# Patient Record
Sex: MALE
[De-identification: names, ages, dates, MRNs, and addresses within clinical notes are randomized per-mention and may not be internally consistent; named-entity substitution may affect disease eponyms.]

---

## 2021-12-31 ENCOUNTER — NURSE TRIAGE (OUTPATIENT)
Dept: OTHER | Facility: CLINIC | Age: 36
End: 2021-12-31

## 2022-01-01 NOTE — TELEPHONE ENCOUNTER
Subjective: Caller states \"i'm having dizziness and can't stand up, light headness\"     Current Symptoms: dizziness, lightheadedness, can't stand without difficulty    Onset: 10 minutes ago; gradual    Associated Symptoms: NA    Pain Severity: 0/10; N/A; none    Temperature: n/a n/a    What has been tried: nothing    LMP: NA Pregnant: NA    Recommended disposition: see PCP within 24 hours, upgraded since holiday weekend recommended Patient be seen in nearest [facility] or ED, pt vebrlaized understanding. Care advice provided, patient verbalizes understanding; denies any other questions or concerns; instructed to call back for any new or worsening symptoms. Patient/caller proceeding to nearest [facility]     This triage is a result of a call to [address] [place] [address]. Please do not respond to the triage nurse through this encounter. Any subsequent communication should be directly with the patient.         Reason for Disposition   [1] MODERATE dizziness (e.g., interferes with normal activities) AND [2] has NOT been evaluated by physician for this  (Exception: dizziness caused by heat exposure, sudden standing, or poor fluid intake)    Protocols used: [facility]

## 2023-06-12 ENCOUNTER — OFFICE VISIT (OUTPATIENT)
Dept: PRIMARY CARE | Facility: CLINIC | Age: 38
End: 2023-06-12
Payer: COMMERCIAL

## 2023-06-12 VITALS
HEART RATE: 88 BPM | BODY MASS INDEX: 42.66 KG/M2 | HEIGHT: 72 IN | RESPIRATION RATE: 16 BRPM | WEIGHT: 315 LBS | SYSTOLIC BLOOD PRESSURE: 126 MMHG | OXYGEN SATURATION: 97 % | DIASTOLIC BLOOD PRESSURE: 79 MMHG

## 2023-06-12 DIAGNOSIS — J45.901 ASTHMA WITH ACUTE EXACERBATION, UNSPECIFIED ASTHMA SEVERITY, UNSPECIFIED WHETHER PERSISTENT (HHS-HCC): Primary | ICD-10-CM

## 2023-06-12 PROBLEM — R16.1 SPLENOMEGALY: Status: ACTIVE | Noted: 2023-06-12

## 2023-06-12 PROBLEM — K76.0 FATTY LIVER: Status: ACTIVE | Noted: 2023-06-12

## 2023-06-12 PROBLEM — R42 VERTIGO: Status: ACTIVE | Noted: 2023-06-12

## 2023-06-12 PROBLEM — E66.01 OBESITY, MORBID (MORE THAN 100 LBS OVER IDEAL WEIGHT OR BMI > 40) (MULTI): Status: ACTIVE | Noted: 2023-06-12

## 2023-06-12 PROBLEM — K57.90 DIVERTICULOSIS: Status: ACTIVE | Noted: 2023-06-12

## 2023-06-12 PROBLEM — J45.909 ASTHMA (HHS-HCC): Status: ACTIVE | Noted: 2023-06-12

## 2023-06-12 PROBLEM — R07.9 CHEST PAIN: Status: ACTIVE | Noted: 2023-06-12

## 2023-06-12 PROBLEM — D50.9 IDA (IRON DEFICIENCY ANEMIA): Status: ACTIVE | Noted: 2023-06-12

## 2023-06-12 PROBLEM — K80.20 CHOLELITHIASES: Status: ACTIVE | Noted: 2023-06-12

## 2023-06-12 PROBLEM — D58.2: Status: ACTIVE | Noted: 2023-06-12

## 2023-06-12 PROCEDURE — 1036F TOBACCO NON-USER: CPT | Performed by: FAMILY MEDICINE

## 2023-06-12 PROCEDURE — 99214 OFFICE O/P EST MOD 30 MIN: CPT | Performed by: FAMILY MEDICINE

## 2023-06-12 RX ORDER — ALBUTEROL SULFATE 90 UG/1
1 AEROSOL, METERED RESPIRATORY (INHALATION) EVERY 4 HOURS PRN
Qty: 8 G | Refills: 0 | Status: SHIPPED | OUTPATIENT
Start: 2023-06-12 | End: 2023-07-05 | Stop reason: SDUPTHER

## 2023-06-12 RX ORDER — BECLOMETHASONE DIPROPIONATE HFA 80 UG/1
1 AEROSOL, METERED RESPIRATORY (INHALATION) 2 TIMES DAILY
COMMUNITY
End: 2023-06-12 | Stop reason: SDUPTHER

## 2023-06-12 RX ORDER — AMOXICILLIN AND CLAVULANATE POTASSIUM 875; 125 MG/1; MG/1
1 TABLET, FILM COATED ORAL 2 TIMES DAILY
COMMUNITY
End: 2023-07-05 | Stop reason: ALTCHOICE

## 2023-06-12 RX ORDER — BECLOMETHASONE DIPROPIONATE HFA 80 UG/1
1 AEROSOL, METERED RESPIRATORY (INHALATION) 2 TIMES DAILY
Qty: 10.6 G | Refills: 0 | Status: SHIPPED | OUTPATIENT
Start: 2023-06-12 | End: 2023-07-05 | Stop reason: SDUPTHER

## 2023-06-12 RX ORDER — MECLIZINE HYDROCHLORIDE 25 MG/1
1 TABLET ORAL 3 TIMES DAILY PRN
COMMUNITY
Start: 2022-01-01 | End: 2023-07-05 | Stop reason: ALTCHOICE

## 2023-06-12 RX ORDER — METHYLPREDNISOLONE 4 MG/1
TABLET ORAL
Qty: 21 TABLET | Refills: 0 | Status: SHIPPED | OUTPATIENT
Start: 2023-06-12 | End: 2023-06-19

## 2023-06-12 RX ORDER — ALBUTEROL SULFATE 90 UG/1
1 AEROSOL, METERED RESPIRATORY (INHALATION) EVERY 4 HOURS PRN
COMMUNITY
End: 2023-06-12 | Stop reason: SDUPTHER

## 2023-06-12 NOTE — PATIENT INSTRUCTIONS
Oral steroid.  Restart Qvar.  Albuterol refilled.  Call, send message through [employer], or return to office prn if these symptoms worsen or fail to improve as anticipated.     Schedule annual wellness visit.

## 2023-06-12 NOTE — PROGRESS NOTES
Subjective   Patient ID: Will Garcia is a 37 y.o. male who presents for Asthma (Coughing spells, using emergency inhaler more than usual/).    HPI   H/O Asthma.  Out of Qvar x 6 months.  Reports cough (scant mucous), SOB, wheeze.  No fevers.  Does not feel sick.  Reports increased rescue inhaler use (2-3 times/day) over the last 2-3 wks.  Normally uses rescue inhaler < 1 time/wk at baseline.    Review of Systems   All other systems reviewed and are negative.    Objective   /79   Pulse 88   Resp 16   Ht 1.829 m (6')   Wt (!) 155 kg (341 lb 9.6 oz)   SpO2 97%   BMI 46.33 kg/m²     Physical Exam  Constitutional:       General: He is not in acute distress.     Appearance: He is morbidly obese.   Eyes:      Conjunctiva/sclera: Conjunctivae normal.   Cardiovascular:      Rate and Rhythm: Normal rate and regular rhythm.      Heart sounds: Normal heart sounds. No murmur heard.     No friction rub. No gallop.   Pulmonary:      Effort: Pulmonary effort is normal. No respiratory distress.      Breath sounds: Normal breath sounds. No wheezing, rhonchi or rales.   Skin:     Coloration: Skin is not jaundiced or pale.   Neurological:      Mental Status: He is oriented to person, place, and time.   Psychiatric:         Mood and Affect: Mood normal.         Behavior: Behavior normal.     Assessment/Plan   Diagnoses and all orders for this visit:  Asthma with acute exacerbation, unspecified asthma severity, unspecified whether persistent  -     methylPREDNISolone (Medrol Dospak) 4 mg tablets; Take as directed on package.  -     Qvar RediHaler 80 mcg/actuation inhaler; Inhale 1 Inhalation 2 times a day.  -     albuterol 90 mcg/actuation inhaler; Inhale 1 puff every 4 hours if needed for wheezing or shortness of breath.    Oral steroid.  Restart Qvar.  Albuterol refilled.  Call, send message through [employer], or return to office prn if these symptoms worsen or fail to improve as anticipated.     Schedule annual wellness  visit.

## 2023-07-05 ENCOUNTER — OFFICE VISIT (OUTPATIENT)
Dept: PRIMARY CARE | Facility: CLINIC | Age: 38
End: 2023-07-05
Payer: COMMERCIAL

## 2023-07-05 ENCOUNTER — LAB (OUTPATIENT)
Dept: LAB | Facility: LAB | Age: 38
End: 2023-07-05
Payer: COMMERCIAL

## 2023-07-05 VITALS
SYSTOLIC BLOOD PRESSURE: 126 MMHG | OXYGEN SATURATION: 98 % | RESPIRATION RATE: 16 BRPM | DIASTOLIC BLOOD PRESSURE: 74 MMHG | HEIGHT: 72 IN | HEART RATE: 82 BPM | BODY MASS INDEX: 42.66 KG/M2 | WEIGHT: 315 LBS

## 2023-07-05 DIAGNOSIS — Z11.59 NEED FOR HEPATITIS C SCREENING TEST: ICD-10-CM

## 2023-07-05 DIAGNOSIS — J45.901 ASTHMA WITH ACUTE EXACERBATION, UNSPECIFIED ASTHMA SEVERITY, UNSPECIFIED WHETHER PERSISTENT (HHS-HCC): ICD-10-CM

## 2023-07-05 DIAGNOSIS — D58.2 HEMOGLOBIN C DISEASE (CMS-HCC): ICD-10-CM

## 2023-07-05 DIAGNOSIS — D50.9 IRON DEFICIENCY ANEMIA, UNSPECIFIED IRON DEFICIENCY ANEMIA TYPE: ICD-10-CM

## 2023-07-05 DIAGNOSIS — K76.0 FATTY LIVER: ICD-10-CM

## 2023-07-05 DIAGNOSIS — E66.01 CLASS 3 SEVERE OBESITY DUE TO EXCESS CALORIES WITH BODY MASS INDEX (BMI) OF 45.0 TO 49.9 IN ADULT, UNSPECIFIED WHETHER SERIOUS COMORBIDITY PRESENT (MULTI): ICD-10-CM

## 2023-07-05 DIAGNOSIS — Z00.00 ANNUAL PHYSICAL EXAM: Primary | ICD-10-CM

## 2023-07-05 PROCEDURE — 80048 BASIC METABOLIC PNL TOTAL CA: CPT

## 2023-07-05 PROCEDURE — 86803 HEPATITIS C AB TEST: CPT

## 2023-07-05 PROCEDURE — 3008F BODY MASS INDEX DOCD: CPT | Performed by: FAMILY MEDICINE

## 2023-07-05 PROCEDURE — 82728 ASSAY OF FERRITIN: CPT

## 2023-07-05 PROCEDURE — 99395 PREV VISIT EST AGE 18-39: CPT | Performed by: FAMILY MEDICINE

## 2023-07-05 PROCEDURE — 80076 HEPATIC FUNCTION PANEL: CPT

## 2023-07-05 PROCEDURE — 1036F TOBACCO NON-USER: CPT | Performed by: FAMILY MEDICINE

## 2023-07-05 PROCEDURE — 99214 OFFICE O/P EST MOD 30 MIN: CPT | Performed by: FAMILY MEDICINE

## 2023-07-05 PROCEDURE — 85027 COMPLETE CBC AUTOMATED: CPT

## 2023-07-05 PROCEDURE — 83540 ASSAY OF IRON: CPT

## 2023-07-05 PROCEDURE — 84466 ASSAY OF TRANSFERRIN: CPT

## 2023-07-05 PROCEDURE — 36415 COLL VENOUS BLD VENIPUNCTURE: CPT

## 2023-07-05 RX ORDER — ALBUTEROL SULFATE 90 UG/1
1 AEROSOL, METERED RESPIRATORY (INHALATION) EVERY 4 HOURS PRN
Qty: 8 G | Refills: 0 | Status: SHIPPED | OUTPATIENT
Start: 2023-07-05

## 2023-07-05 RX ORDER — BECLOMETHASONE DIPROPIONATE HFA 80 UG/1
80 AEROSOL, METERED RESPIRATORY (INHALATION) 2 TIMES DAILY
Qty: 31.8 G | Refills: 1 | Status: SHIPPED | OUTPATIENT
Start: 2023-07-05

## 2023-07-05 ASSESSMENT — PATIENT HEALTH QUESTIONNAIRE - PHQ9
SUM OF ALL RESPONSES TO PHQ9 QUESTIONS 1 AND 2: 0
2. FEELING DOWN, DEPRESSED OR HOPELESS: NOT AT ALL
1. LITTLE INTEREST OR PLEASURE IN DOING THINGS: NOT AT ALL

## 2023-07-05 NOTE — PATIENT INSTRUCTIONS
Fasting labs.  Liver ultrasound.  Refilled medications.  Recommend weight loss efforts (see [email or web address]/nutrition for ideas).    F/U 6 months: Med refills.

## 2023-07-05 NOTE — PROGRESS NOTES
Subjective   Patient ID: Will Garcia is a 37 y.o. male who presents for Annual Exam.    HPI   Patient's health is described as good.  Regular dental visits: Yes.  Dental hygiene (brushing/flossing) regularly performed: Yes.  Corrective lenses: No.  Vision problems: No.  Last eye exam within 1 year: No.  Hearing loss: No.  Requests audiology referral: No.  Immunizations up to date: Yes.  Healthy diet: Yes.  Regular exercise: Yes.  Trying to lose weight: Yes.  Requests nutrition/weight loss referral: No.  Sexually active: Yes.  Using contraception: Yes (wife).  Requests STD screening: No.  Colon cancer screening up to date: N/A.  Hepatitis C screening up to date: No.    H/O Asthma.  Condition(s) stable.  Taking med(s) as directed.  Requests refills.    Review of Systems  No acute physical complaints.    Objective   /74   Pulse 82   Resp 16   Ht 1.829 m (6')   Wt (!) 152 kg (334 lb 8 oz)   SpO2 98%   BMI 45.37 kg/m²     Physical Exam  Constitutional:       General: He is not in acute distress.     Appearance: He is morbidly obese.   HENT:      Head: Normocephalic.      Right Ear: Tympanic membrane normal.      Left Ear: Tympanic membrane normal.      Mouth/Throat:      Pharynx: Oropharynx is clear. No oropharyngeal exudate or posterior oropharyngeal erythema.   Eyes:      Extraocular Movements: Extraocular movements intact.      Conjunctiva/sclera: Conjunctivae normal.      Pupils: Pupils are equal, round, and reactive to light.   Neck:      Thyroid: No thyromegaly.      Vascular: No carotid bruit.   Cardiovascular:      Rate and Rhythm: Normal rate and regular rhythm.      Heart sounds: Normal heart sounds. No murmur heard.     No friction rub. No gallop.   Pulmonary:      Effort: Pulmonary effort is normal.      Breath sounds: Normal breath sounds. No wheezing, rhonchi or rales.   Abdominal:      General: Bowel sounds are normal. There is no distension.      Palpations: Abdomen is soft. There is no mass.       Tenderness: There is no abdominal tenderness. There is no guarding or rebound.   Lymphadenopathy:      Cervical: No cervical adenopathy.   Skin:     Coloration: Skin is not jaundiced or pale.   Neurological:      General: No focal deficit present.      Mental Status: He is oriented to person, place, and time.   Psychiatric:         Mood and Affect: Mood normal.         Behavior: Behavior normal.     Assessment/Plan   Diagnoses and all orders for this visit:  Annual physical exam  -     Lipid Panel; Future  Asthma with acute exacerbation, unspecified asthma severity, unspecified whether persistent  -     beclomethasone dipropionate (Qvar RediHaler) 80 mcg/actuation inhaler; Inhale 1 Inhalation 2 times a day.  -     albuterol 90 mcg/actuation inhaler; Inhale 1 puff every 4 hours if needed for wheezing or shortness of breath.  Iron deficiency anemia, unspecified iron deficiency anemia type  -     CBC; Future  -     Ferritin; Future  -     Iron and TIBC; Future  -     Transferrin; Future  -     Basic Metabolic Panel; Future  Hemoglobin C disease (CMS/HCC)  -     CBC; Future  -     Ferritin; Future  -     Iron and TIBC; Future  -     Transferrin; Future  -     Basic Metabolic Panel; Future  Fatty liver  -     US abdomen limited liver; Future  -     Hepatic function panel; Future  Need for hepatitis C screening test  -     Hepatitis C Antibody; Future  Class 3 severe obesity due to excess calories with body mass index (BMI) of 45.0 to 49.9 in adult, unspecified whether serious comorbidity present (CMS/HCC)    Fasting labs.  Liver ultrasound.  Refilled medications.  Recommend weight loss efforts (see [email or web address]/nutrition for ideas).    F/U 6 months: Med refills.

## 2023-07-06 ENCOUNTER — LAB (OUTPATIENT)
Dept: LAB | Facility: LAB | Age: 38
End: 2023-07-06
Payer: COMMERCIAL

## 2023-07-06 DIAGNOSIS — Z00.00 ANNUAL PHYSICAL EXAM: ICD-10-CM

## 2023-07-06 LAB
ALANINE AMINOTRANSFERASE (SGPT) (U/L) IN SER/PLAS: 30 U/L (ref 10–52)
ALBUMIN (G/DL) IN SER/PLAS: 4.1 G/DL (ref 3.4–5)
ALKALINE PHOSPHATASE (U/L) IN SER/PLAS: 33 U/L (ref 33–120)
ANION GAP IN SER/PLAS: 11 MMOL/L (ref 10–20)
ASPARTATE AMINOTRANSFERASE (SGOT) (U/L) IN SER/PLAS: 26 U/L (ref 9–39)
BILIRUBIN DIRECT (MG/DL) IN SER/PLAS: 0.3 MG/DL (ref 0–0.3)
BILIRUBIN TOTAL (MG/DL) IN SER/PLAS: 1.5 MG/DL (ref 0–1.2)
CALCIUM (MG/DL) IN SER/PLAS: 9.2 MG/DL (ref 8.6–10.6)
CARBON DIOXIDE, TOTAL (MMOL/L) IN SER/PLAS: 26 MMOL/L (ref 21–32)
CHLORIDE (MMOL/L) IN SER/PLAS: 108 MMOL/L (ref 98–107)
CHOLESTEROL (MG/DL) IN SER/PLAS: 109 MG/DL (ref 0–199)
CHOLESTEROL IN HDL (MG/DL) IN SER/PLAS: 30 MG/DL
CHOLESTEROL/HDL RATIO: 3.6
CREATININE (MG/DL) IN SER/PLAS: 0.79 MG/DL (ref 0.5–1.3)
ERYTHROCYTE DISTRIBUTION WIDTH (RATIO) BY AUTOMATED COUNT: 22.5 % (ref 11.5–14.5)
ERYTHROCYTE MEAN CORPUSCULAR HEMOGLOBIN CONCENTRATION (G/DL) BY AUTOMATED: 34.7 G/DL (ref 32–36)
ERYTHROCYTE MEAN CORPUSCULAR VOLUME (FL) BY AUTOMATED COUNT: 66 FL (ref 80–100)
ERYTHROCYTES (10*6/UL) IN BLOOD BY AUTOMATED COUNT: 4.3 X10E12/L (ref 4.5–5.9)
FERRITIN (UG/LL) IN SER/PLAS: 802 UG/L (ref 20–300)
GFR MALE: >90 ML/MIN/1.73M2
GLUCOSE (MG/DL) IN SER/PLAS: 90 MG/DL (ref 74–99)
HEMATOCRIT (%) IN BLOOD BY AUTOMATED COUNT: 28.5 % (ref 41–52)
HEMOGLOBIN (G/DL) IN BLOOD: 9.9 G/DL (ref 13.5–17.5)
HEPATITIS C VIRUS AB PRESENCE IN SERUM: NONREACTIVE
IRON (UG/DL) IN SER/PLAS: 62 UG/DL (ref 35–150)
IRON BINDING CAPACITY (UG/DL) IN SER/PLAS: 301 UG/DL (ref 240–445)
IRON SATURATION (%) IN SER/PLAS: 21 % (ref 25–45)
LDL: 59 MG/DL (ref 0–99)
LEUKOCYTES (10*3/UL) IN BLOOD BY AUTOMATED COUNT: 10.6 X10E9/L (ref 4.4–11.3)
NRBC (PER 100 WBCS) BY AUTOMATED COUNT: 0.3 /100 WBC (ref 0–0)
PLATELETS (10*3/UL) IN BLOOD AUTOMATED COUNT: 219 X10E9/L (ref 150–450)
POTASSIUM (MMOL/L) IN SER/PLAS: 4 MMOL/L (ref 3.5–5.3)
PROTEIN TOTAL: 7.4 G/DL (ref 6.4–8.2)
SODIUM (MMOL/L) IN SER/PLAS: 141 MMOL/L (ref 136–145)
TRANSFERRIN (MG/DL) IN SER/PLAS: 210 MG/DL (ref 200–360)
TRIGLYCERIDE (MG/DL) IN SER/PLAS: 101 MG/DL (ref 0–149)
UREA NITROGEN (MG/DL) IN SER/PLAS: 15 MG/DL (ref 6–23)
VLDL: 20 MG/DL (ref 0–40)

## 2023-07-06 PROCEDURE — 36415 COLL VENOUS BLD VENIPUNCTURE: CPT

## 2023-07-06 PROCEDURE — 80061 LIPID PANEL: CPT

## 2023-07-09 DIAGNOSIS — D64.9 ANEMIA, UNSPECIFIED TYPE: ICD-10-CM

## 2023-07-09 DIAGNOSIS — E87.8 HIGH SERUM CHLORIDE: ICD-10-CM

## 2023-07-09 DIAGNOSIS — R79.89 ELEVATED FERRITIN: ICD-10-CM

## 2023-07-09 DIAGNOSIS — D58.2 HEMOGLOBIN C DISEASE (CMS-HCC): ICD-10-CM

## 2023-07-09 DIAGNOSIS — R16.0 HEPATOMEGALY: Primary | ICD-10-CM

## 2023-07-09 DIAGNOSIS — K76.0 FATTY LIVER: ICD-10-CM

## 2023-10-17 ENCOUNTER — LAB (OUTPATIENT)
Dept: LAB | Facility: LAB | Age: 38
End: 2023-10-17
Payer: COMMERCIAL

## 2023-10-17 ENCOUNTER — OFFICE VISIT (OUTPATIENT)
Dept: GASTROENTEROLOGY | Facility: CLINIC | Age: 38
End: 2023-10-17
Payer: COMMERCIAL

## 2023-10-17 VITALS
SYSTOLIC BLOOD PRESSURE: 123 MMHG | WEIGHT: 315 LBS | OXYGEN SATURATION: 96 % | HEIGHT: 72 IN | HEART RATE: 88 BPM | BODY MASS INDEX: 42.66 KG/M2 | DIASTOLIC BLOOD PRESSURE: 78 MMHG

## 2023-10-17 DIAGNOSIS — K76.0 FATTY LIVER: ICD-10-CM

## 2023-10-17 DIAGNOSIS — R16.0 HEPATOMEGALY: Primary | ICD-10-CM

## 2023-10-17 DIAGNOSIS — E87.8 HIGH SERUM CHLORIDE: ICD-10-CM

## 2023-10-17 PROCEDURE — 87340 HEPATITIS B SURFACE AG IA: CPT

## 2023-10-17 PROCEDURE — 86803 HEPATITIS C AB TEST: CPT

## 2023-10-17 PROCEDURE — 80048 BASIC METABOLIC PNL TOTAL CA: CPT

## 2023-10-17 PROCEDURE — 3008F BODY MASS INDEX DOCD: CPT | Performed by: INTERNAL MEDICINE

## 2023-10-17 PROCEDURE — 86708 HEPATITIS A ANTIBODY: CPT

## 2023-10-17 PROCEDURE — 1036F TOBACCO NON-USER: CPT | Performed by: INTERNAL MEDICINE

## 2023-10-17 PROCEDURE — 99214 OFFICE O/P EST MOD 30 MIN: CPT | Performed by: INTERNAL MEDICINE

## 2023-10-17 PROCEDURE — 99204 OFFICE O/P NEW MOD 45 MIN: CPT | Performed by: INTERNAL MEDICINE

## 2023-10-17 PROCEDURE — 86706 HEP B SURFACE ANTIBODY: CPT

## 2023-10-17 PROCEDURE — 36415 COLL VENOUS BLD VENIPUNCTURE: CPT

## 2023-10-17 RX ORDER — LANOLIN ALCOHOL/MO/W.PET/CERES
100 CREAM (GRAM) TOPICAL DAILY
COMMUNITY

## 2023-10-17 RX ORDER — FOLIC ACID 0.8 MG
TABLET ORAL DAILY
COMMUNITY

## 2023-10-17 ASSESSMENT — PAIN SCALES - GENERAL: PAINLEVEL: 0-NO PAIN

## 2023-10-17 NOTE — PROGRESS NOTES
HEPATOLOGY NEW PATIENT VISIT    October 17, 2023    Dr. Tyson Martinez       Patient Name:   WILL CANTU  Medical Record Number: [id number]  YOB: 1985    Dear Dr. Martinez,    I had the pleasure of seeing Will Cantu for consultation in the [facility] ([facility] office). History and physical examination was performed. Pertinent available laboratory, imaging, pathology results were reviewed.     History of Present Illness:   The patient is a 37 year old black male who is referred for evaluation of fatty liver disease, hepatomegaly and elevated ferritin.    The patient also has chronic microcytic anemia and elevated ferritin levels and is followed in the hematology clinic.  He carries a diagnosis of hemoglobin CC disease.  He has a hemoglobin of approximately 10 with a very low MCV.  He also has elevated ferritin levels.    The patient has risk factors for fatty liver disease including morbid obesity.  He has been gaining weight.  He has no known history of hyperlipidemia.  He has no known history of diabetes.    The patient denied any past history of acute hepatitis or jaundice.      He has never had any manifestations of liver disease including no jaundice, ascites, hepatic encephalopathy, or variceal bleeding. He denied ever having a liver biopsy.    He presented today for evaluation.  He denied any specific liver-related complaints.     Past Medical/Surgical History:   Asthma  Chest pain  Cholelithiases  Diverticulosis  Fatty liver  Hemoglobin C disease (CMS/HCC)  ALESIA (iron deficiency anemia)  Obesity, morbid (more than 100 lbs over ideal weight or BMI > 40) (CMS/HCC)  Splenomegaly  Vertigo  Hepatomegaly    Family History:   There is a family history of hemoglobin C disease.  There is no known family history of liver disease.  There is no known family history of colon cancer.    Social History:   He denied tobacco use.  He is a teacher in [employer].  He drinks  alcohol occasionally.  He denied tattoos.  He denied blood transfusions.    Review of systems: As noted above.  He has weight gain as noted above.  No fever, chills, weight loss, visual changes, auditory changes, shortness of breath, chest pain, abdominal pain, GI bleeding, diarrhea, constipation, depression, dysuria, hematuria, musculoskeletal issues, or rash.       Medical, Surgical, Family, and Social Histories  Past Medical History:   Diagnosis Date    Acute maxillary sinusitis, unspecified 07/09/2016    Acute maxillary sinusitis    Contact with and (suspected) exposure to infections with a predominantly sexual mode of transmission 03/05/2021    Exposure to trichomonas    Left upper quadrant abdominal tenderness 01/26/2022    Abdominal tenderness, left upper quadrant    Other chest pain 09/16/2020    Atypical chest pain    Other infective otitis externa, right ear 03/27/2021    Acute infective otitis externa, right    Other muscle spasm 01/26/2022    Trapezius muscle spasm    Other specified health status     No pertinent past medical history    Personal history of other specified conditions 04/21/2022    History of chest pain    Personal history of other specified conditions 04/21/2022    History of palpitations    Personal history of urinary calculi     History of kidney stones    Pleurodynia 09/01/2021    Rib pain on left side       Past Surgical History:   Procedure Laterality Date    NO PAST SURGERIES  09/16/2020    No history of surgery       Family History   Problem Relation Name Age of Onset    Hypertension Mother      Coronary artery disease Father      Hypertension Father      Coronary artery disease Sister      Heart attack Sister      Stroke Mother's Sister      Hypertension Mother's Sister      Diabetes Mother's Sister      Coronary artery disease Mother's Brother      Hypertension Mother's Brother      Heart attack Mother's Brother      Diabetes Mother's Brother         Social History      Socioeconomic History    Marital status:      Spouse name: Not on file    Number of children: Not on file    Years of education: Not on file    Highest education level: Not on file   Occupational History    Not on file   Tobacco Use    Smoking status: Never    Smokeless tobacco: Never   Substance and Sexual Activity    Alcohol use: Yes    Drug use: Not Currently    Sexual activity: Not on file   Other Topics Concern    Not on file   Social History Narrative    Not on file     Social Determinants of Health     Financial Resource Strain: Not on file   Food Insecurity: Not on file   Transportation Needs: Not on file   Physical Activity: Not on file   Stress: Not on file   Social Connections: Not on file   Intimate Partner Violence: Not on file   Housing Stability: Not on file       Allergies and Current Medications  No Known Allergies  Current Outpatient Medications   Medication Sig Dispense Refill    albuterol 90 mcg/actuation inhaler Inhale 1 puff every 4 hours if needed for wheezing or shortness of breath. 8 g 0    beclomethasone dipropionate (Qvar RediHaler) 80 mcg/actuation inhaler Inhale 1 Inhalation 2 times a day. 31.8 g 1    cyanocobalamin (Vitamin B-12) 1,000 mcg tablet Take 100 mcg by mouth once daily.      folic acid (Folvite) 800 mcg tablet Take by mouth once daily.       No current facility-administered medications for this visit.        Physical Exam  /78   Pulse 88   Ht 1.829 m (6')   Wt (!) 156 kg (345 lb)   SpO2 96%   BMI 46.79 kg/m²       Physical Examination:   General Appearance: alert and in no acute distress.   HEENT: oropharynx without lesions. Anicteric sclerae.   Neck supple, nontender, without adenopathy, thyromegaly, or JVD.   Lungs clear to auscultation and percussion.   Heart RRR without murmurs, rubs, or gallops.   Abdomen: Soft, nontender, bowel sounds positive, without obvious ascites. Liver and spleen not palpable.  I cannot appreciate hepatosplenomegaly due to  significant central obesity.  Extremities full ROM, no atrophy, normal strength. No edema.   Skin no specific lesions.   Neurological exam nonfocal, alert and oriented.  No asterixis.  No spider angiomata, or palmar erythema.     Labs 7/6/2023 cholesterol 109, triglycerides 101.    Labs 7/5/2023 protein 7.4, albumin 4.1, alk phos 33, bilirubin 1.5, direct bilirubin 0.3, AST 26, ALT 30, hepatitis C antibody negative, glucose 90, sodium 141, creatinine 0.79, iron saturation 21%, ferritin 802, WBC 10.6, hemoglobin 9.9, platelets 219.    Labs 1/26/2022 bilirubin 1.9, AST 24, ALT 30, alk phos 32.    Labs 3/6/2020 bilirubin 2.2, AST 61, ALT 27, WBC 10.8, hemoglobin 11.3, MCV 64, platelets 252.    Ultrasound 7/5/2023:  IMPRESSION:  Hepatomegaly. Diffusely increased hepatic echogenicity may be seen  with hepatic steatosis or hepatocellular disease. Areas of focal  fatty sparing along the gallbladder indicates an element of steatosis.     Cholelithiasis.      CAT scan without contrast 3/3/2022:  IMPRESSION:  1. Cholelithiasis without evidence of acute cholecystitis.  2. No evidence to suggest pancreatitis.  3. Persistent hepatosplenomegaly with findings consistent with  steatosis.  4. Scattered colonic diverticulosis without diverticulitis.    CAT scan without contrast 2/3/2016:  IMPRESSION:      Mild asymmetric left hydroureter due to an obstructing 4 mm distal   left ureteral calculus.     Cholelithiasis without CT evidence for acute cholecystitis.     Splenomegaly.     Hepatic steatosis with focal sparing adjacent to the gallbladder   fossa and crystal hepatis.     Partial sacralization of the L5 vertebral body with a   pseudoarticulation between L5 and S1 on the left.  Correlate   clinically with Bertolotti syndrome.          Assessment/Plan:     Fatty liver disease  Elevated ferritin levels  Hepatomegaly    The patient is referred to me for evaluation of the above issues.    We did speak about fatty liver disease. I  explained that the risk factors include diabetes, obesity, hyperlipidemia and alcohol use. I explained that he needs to work on diet, weight loss and exercise.  He also needs to work aggressively with his primary provider about weight loss options.  I did explain that 20-25% of patients with WINTER may proceed to cirrhosis.    I reminded him that working on the same risk factors could decrease his future risk of heart disease and stroke.    I will check a hemoglobin A1c.    I will stage his liver disease with a FibroSCAN test.    He is being followed by hematology for the elevated ferritin levels.  I will defer further work-up and management of this to him.  With his low iron saturation, I do not feel that a hemochromatosis test is necessary.  Do keep in mind that ferritin can be elevated in patients with fatty liver disease as well.    His indirect bilirubin level is elevated.  It is almost entirely indirect hyperbilirubinemia.  This is almost assuredly due to hemolysis from his hemoglobinopathy and I would not work this up any further.    I will do some basic hepatitis serologies.    Thank you for allowing me to participate in the care of this patient. Please feel free to contact me with any questions regarding their care.     Sincerely,     Matt Harris MD, FAASLD, FACG.   Medical Director, Hepatology  Senior Attending Physician  [facility]  [facility]  Professor of Medicine  Division of Gastroenterology and Liver Disease  [employer] [facility] [employer]  [address] [place] [id number]  Phone: [phone]  Fax: [phone].      This document was generated with a computerized dictation system.  Because of this, there could be errors in grammar and/or content.

## 2023-10-17 NOTE — PATIENT INSTRUCTIONS
Aggressively work on diet, weight loss, and exercise.    Get labs.    Get a FibroScan ultrasound of the liver.

## 2023-10-18 LAB
ANION GAP SERPL CALC-SCNC: 14 MMOL/L (ref 10–20)
BUN SERPL-MCNC: 12 MG/DL (ref 6–23)
CALCIUM SERPL-MCNC: 9 MG/DL (ref 8.6–10.6)
CHLORIDE SERPL-SCNC: 104 MMOL/L (ref 98–107)
CO2 SERPL-SCNC: 25 MMOL/L (ref 21–32)
CREAT SERPL-MCNC: 0.75 MG/DL (ref 0.5–1.3)
GFR SERPL CREATININE-BSD FRML MDRD: >90 ML/MIN/1.73M*2
GLUCOSE SERPL-MCNC: 83 MG/DL (ref 74–99)
HAV AB SER QL IA: NONREACTIVE
HBV SURFACE AB SER-ACNC: 5.7 MIU/ML
HBV SURFACE AG SERPL QL IA: NONREACTIVE
HCV AB SER QL: NONREACTIVE
POTASSIUM SERPL-SCNC: 4.2 MMOL/L (ref 3.5–5.3)
SODIUM SERPL-SCNC: 139 MMOL/L (ref 136–145)

## 2023-11-15 ENCOUNTER — CLINICAL SUPPORT (OUTPATIENT)
Dept: GASTROENTEROLOGY | Facility: HOSPITAL | Age: 38
End: 2023-11-15
Payer: COMMERCIAL

## 2023-11-15 DIAGNOSIS — K76.0 FATTY LIVER: ICD-10-CM

## 2023-11-15 PROCEDURE — 91200 LIVER ELASTOGRAPHY: CPT | Performed by: INTERNAL MEDICINE

## 2023-11-15 PROCEDURE — 91200 LIVER ELASTOGRAPHY: CPT

## 2023-11-20 ENCOUNTER — TELEPHONE (OUTPATIENT)
Dept: GASTROENTEROLOGY | Facility: CLINIC | Age: 38
End: 2023-11-20
Payer: COMMERCIAL

## 2023-11-20 DIAGNOSIS — K76.0 FATTY LIVER: ICD-10-CM

## 2023-11-20 NOTE — TELEPHONE ENCOUNTER
Called patient discussed results and plan per Dr Harris:    HEPATOLOGY ATTENDING NOTE     I personally reviewed and interpreted the FibroSCAN results.     It revealed a median liver stiffness of 12.1 kPa with an IQR of 12% and   .     This is consistent with stage 3 disease with severe steatosis.     He needs to aggressively work on weight loss, follow the Mediterranean diet and increase exercise.  He needs to get   hepatitis A and hepatitis B vaccines.  He should get LFTs and CBC in 6   months and see us back in clinic in 7 months.     MARIANA Harris.

## 2025-05-18 ENCOUNTER — OFFICE VISIT (OUTPATIENT)
Dept: URGENT CARE | Age: 40
End: 2025-05-18
Payer: COMMERCIAL

## 2025-05-18 VITALS
HEART RATE: 82 BPM | DIASTOLIC BLOOD PRESSURE: 84 MMHG | TEMPERATURE: 97.8 F | RESPIRATION RATE: 20 BRPM | OXYGEN SATURATION: 95 % | SYSTOLIC BLOOD PRESSURE: 136 MMHG

## 2025-05-18 DIAGNOSIS — R35.0 URINARY FREQUENCY: ICD-10-CM

## 2025-05-18 LAB
POC APPEARANCE, URINE: ABNORMAL
POC BILIRUBIN, URINE: NEGATIVE
POC BLOOD, URINE: ABNORMAL
POC COLOR, URINE: ABNORMAL
POC GLUCOSE, URINE: NEGATIVE MG/DL
POC KETONES, URINE: NEGATIVE MG/DL
POC LEUKOCYTES, URINE: ABNORMAL
POC NITRITE,URINE: NEGATIVE
POC PH, URINE: 6 PH
POC PROTEIN, URINE: ABNORMAL MG/DL
POC SPECIFIC GRAVITY, URINE: 1.02
POC UROBILINOGEN, URINE: 0.2 EU/DL

## 2025-05-18 PROCEDURE — 81003 URINALYSIS AUTO W/O SCOPE: CPT | Performed by: STUDENT IN AN ORGANIZED HEALTH CARE EDUCATION/TRAINING PROGRAM

## 2025-05-18 PROCEDURE — 99214 OFFICE O/P EST MOD 30 MIN: CPT | Performed by: STUDENT IN AN ORGANIZED HEALTH CARE EDUCATION/TRAINING PROGRAM

## 2025-05-18 PROCEDURE — 1036F TOBACCO NON-USER: CPT | Performed by: STUDENT IN AN ORGANIZED HEALTH CARE EDUCATION/TRAINING PROGRAM

## 2025-05-18 RX ORDER — SULFAMETHOXAZOLE AND TRIMETHOPRIM 800; 160 MG/1; MG/1
1 TABLET ORAL 2 TIMES DAILY
Qty: 14 TABLET | Refills: 0 | Status: SHIPPED | OUTPATIENT
Start: 2025-05-18 | End: 2025-05-25

## 2025-05-18 NOTE — PROGRESS NOTES
Subjective   Patient ID: Will Garcia is a 39 y.o. male. They present today with a chief complaint of UTI (Odor in urine, discomfort when urinate and bladder still feels full after he goes, frequency ).    History of Present Illness  Patient reports symptoms present for ~4 days  Bladder feels full, doesn't feel like he is fully emptying with urination  Reports abnormal urinary odor  Endorses dysuria/discomfort at the end of urination  Endorses increased urinary freq  Endorses urgency  Notes that he was sick a week or so ago  Reports that he had constipation which resolved with laxatives  Reports daily BM, formed   Denies hematuria  Denies flank pain  Denies concern for STI/STD - not currently sexually active  Denies penile discharge  Denies rash  Reports hx of kidney stones in the past  Denies hx of prostate issues  Denies smoking  Not drinking too much water, notes that he needs to increase his intake  Denies allergies to antibiotics     Past Medical History  Allergies as of 05/18/2025    (No Known Allergies)       Prescriptions Prior to Admission[1]     Medical History[2]    Surgical History[3]     reports that he has never smoked. He has never used smokeless tobacco. He reports current alcohol use. He reports that he does not currently use drugs.                               Objective    Vitals:    05/18/25 0839   BP: 136/84   BP Location: Left arm   Patient Position: Sitting   Pulse: 82   Resp: 20   Temp: 36.6 °C (97.8 °F)   TempSrc: Oral   SpO2: 95%     No LMP for male patient.    Physical Exam  Constitutional:       General: He is not in acute distress.     Appearance: Normal appearance. He is not toxic-appearing or diaphoretic.   HENT:      Nose: No rhinorrhea.   Eyes:      General: No scleral icterus.        Right eye: No discharge.         Left eye: No discharge.      Extraocular Movements: Extraocular movements intact.   Pulmonary:      Effort: Pulmonary effort is normal.   Abdominal:      Palpations:  Abdomen is soft.      Tenderness: There is no right CVA tenderness, left CVA tenderness or guarding.   Musculoskeletal:      Cervical back: Normal range of motion.   Neurological:      Mental Status: He is alert.   Psychiatric:         Mood and Affect: Mood normal.         Behavior: Behavior normal.         Thought Content: Thought content normal.      Comments: Pleasant         Procedures    Point of Care Test & Imaging Results from this visit:      Diagnostic study results (if any) were reviewed by Harlan Parra MD.    Assessment/Plan   Allergies, medications, history, and pertinent labs/EKGs/Imaging reviewed by Harlan Parra MD.     Medical Decision Making:    Patient's lower urinary tract symptoms are potentially 2/2 prostatitis vs constipation vs less likely UTI or kidney stone or other etiology. UA pos for leuks & blood, neg nitrites. Drink plenty of water. Through shared decision making will trial bactrim. Send out urine culture. Return as needed or follow up with PCP if symptoms fail to improve.     Orders and Diagnoses  Diagnoses and all orders for this visit:  Urinary frequency  -     POCT UA Automated manually resulted  -     Urine Culture  -     sulfamethoxazole-trimethoprim (Bactrim DS) 800-160 mg tablet; Take 1 tablet by mouth 2 times a day for 7 days.      Patient disposition: Home      Medical Admin Record      Follow Up Instructions  No follow-ups on file.    Electronically signed by Harlan Parra MD  9:05 AM             [1] (Not in a hospital admission)   [2]   Past Medical History:  Diagnosis Date    Acute maxillary sinusitis, unspecified 07/09/2016    Acute maxillary sinusitis    Contact with and (suspected) exposure to infections with a predominantly sexual mode of transmission 03/05/2021    Exposure to trichomonas    Left upper quadrant abdominal tenderness 01/26/2022    Abdominal tenderness, left upper quadrant    Other chest pain 09/16/2020    Atypical chest pain    Other  infective otitis externa, right ear 03/27/2021    Acute infective otitis externa, right    Other muscle spasm 01/26/2022    Trapezius muscle spasm    Other specified health status     No pertinent past medical history    Personal history of other specified conditions 04/21/2022    History of chest pain    Personal history of other specified conditions 04/21/2022    History of palpitations    Personal history of urinary calculi     History of kidney stones    Pleurodynia 09/01/2021    Rib pain on left side   [3]   Past Surgical History:  Procedure Laterality Date    NO PAST SURGERIES  09/16/2020    No history of surgery

## 2025-05-21 LAB — BACTERIA UR CULT: ABNORMAL

## 2025-06-03 ENCOUNTER — OFFICE VISIT (OUTPATIENT)
Dept: URGENT CARE | Age: 40
End: 2025-06-03
Payer: COMMERCIAL

## 2025-06-03 VITALS
DIASTOLIC BLOOD PRESSURE: 87 MMHG | SYSTOLIC BLOOD PRESSURE: 131 MMHG | OXYGEN SATURATION: 97 % | TEMPERATURE: 97.4 F | RESPIRATION RATE: 19 BRPM

## 2025-06-03 DIAGNOSIS — R11.2 NAUSEA AND VOMITING, UNSPECIFIED VOMITING TYPE: ICD-10-CM

## 2025-06-03 DIAGNOSIS — K52.9 GASTROENTERITIS: Primary | ICD-10-CM

## 2025-06-03 PROCEDURE — 99070 SPECIAL SUPPLIES PHYS/QHP: CPT

## 2025-06-03 PROCEDURE — 99213 OFFICE O/P EST LOW 20 MIN: CPT

## 2025-06-03 PROCEDURE — 1036F TOBACCO NON-USER: CPT

## 2025-06-03 RX ORDER — ONDANSETRON 4 MG/1
4 TABLET, ORALLY DISINTEGRATING ORAL EVERY 8 HOURS PRN
Qty: 10 TABLET | Refills: 0 | Status: SHIPPED | OUTPATIENT
Start: 2025-06-03 | End: 2025-06-10

## 2025-06-03 ASSESSMENT — ENCOUNTER SYMPTOMS
BLOOD IN STOOL: 0
DIARRHEA: 1
APPETITE CHANGE: 0
NAUSEA: 1
BACK PAIN: 0
SHORTNESS OF BREATH: 0
FLANK PAIN: 0
CHEST TIGHTNESS: 0
VOMITING: 1
DYSURIA: 0
FREQUENCY: 0
HEMATURIA: 0
CHILLS: 0
FEVER: 0
ABDOMINAL PAIN: 0

## 2025-06-03 NOTE — PATIENT INSTRUCTIONS
Continue increasing fluids and Brat diet (blander diet, toast apple cause)  Start with smaller amounts of fluids and increase from there    If no improvement in 48-72 hours, go to ER    Monitor fever    Start antinausea medication as prescribed.     If you are unable to tolerate fluids by mouth go to ER.      At this time suspected viral stomach flu, continue supportive measures and rest.

## 2025-06-03 NOTE — PROGRESS NOTES
Subjective   Patient ID: Will Garcia is a 39 y.o. male. They present today with a chief complaint of Nausea, Vomiting (Started this morning ), and Diarrhea (/).    History of Present Illness  Patient presents with sudden onset of chills, nausea, vomiting and diarrhea a few hours ago. Denies recorded fever. Denies chest pain, sob. Denies abdminal pain. He explains that symptoms came on suddenly today. Denies any blood in stool or vomit.           Past Medical History  Allergies as of 06/03/2025    (No Known Allergies)       Prescriptions Prior to Admission[1]     Medical History[2]    Surgical History[3]     reports that he has never smoked. He has never used smokeless tobacco. He reports current alcohol use. He reports that he does not currently use drugs.    Review of Systems  Review of Systems   Constitutional:  Negative for appetite change, chills and fever.   Respiratory:  Negative for chest tightness and shortness of breath.    Cardiovascular:  Negative for chest pain.   Gastrointestinal:  Positive for diarrhea, nausea and vomiting. Negative for abdominal pain and blood in stool.   Genitourinary:  Negative for dysuria, flank pain, frequency, hematuria, penile discharge, penile pain, penile swelling, scrotal swelling, testicular pain and urgency.   Musculoskeletal:  Negative for back pain.   Skin:  Negative for rash.                                  Objective    Vitals:    06/03/25 1105   BP: 131/87   BP Location: Left arm   Patient Position: Sitting   Resp: 19   Temp: 36.3 °C (97.4 °F)   TempSrc: Oral   SpO2: 97%     No LMP for male patient.    Physical Exam  Constitutional:       General: He is not in acute distress.     Appearance: He is not toxic-appearing.   HENT:      Head: Normocephalic and atraumatic.   Eyes:      Extraocular Movements: Extraocular movements intact.      Pupils: Pupils are equal, round, and reactive to light.   Cardiovascular:      Rate and Rhythm: Normal rate and regular rhythm.    Pulmonary:      Effort: Pulmonary effort is normal. No respiratory distress.      Breath sounds: Normal breath sounds. No wheezing.   Abdominal:      General: Abdomen is flat. There is no distension.      Palpations: Abdomen is soft.      Tenderness: There is no abdominal tenderness. There is no right CVA tenderness, left CVA tenderness or guarding.   Musculoskeletal:      Cervical back: Normal range of motion.   Neurological:      Mental Status: He is alert.         Procedures    Point of Care Test & Imaging Results from this visit  No results found for this visit on 06/03/25.   Imaging  No results found.    Cardiology, Vascular, and Other Imaging  No other imaging results found for the past 2 days      Diagnostic study results (if any) were reviewed by Leandra Casas PA-C.    Assessment/Plan   Allergies, medications, history, and pertinent labs/EKGs/Imaging reviewed by Leandra Casas PA-C.     Medical Decision Making  MDM- signs and symptoms consistent with viral gastroenteritis. No evidence of acute abdomen pain or evidence of dehydration. Treatment is supportive measures and hydration. Patient is advised to return to clinic or present to ED if symptoms change, worsen, or do not begin to improve within 48 hours. Otherwise follow with PCP.   Patient verbalized understanding and agrees with plan.       Orders and Diagnoses  Diagnoses and all orders for this visit:  Gastroenteritis  -     ondansetron ODT (Zofran-ODT) 4 mg disintegrating tablet; Dissolve 1 tablet (4 mg) in the mouth every 8 hours if needed for nausea or vomiting for up to 7 days.  Nausea and vomiting, unspecified vomiting type      Medical Admin Record      Patient disposition: Home    Electronically signed by Leandra Casas PA-C  11:17 AM           [1] (Not in a hospital admission)   [2]   Past Medical History:  Diagnosis Date    Acute maxillary sinusitis, unspecified 07/09/2016    Acute maxillary sinusitis    Contact with and  (suspected) exposure to infections with a predominantly sexual mode of transmission 03/05/2021    Exposure to trichomonas    Left upper quadrant abdominal tenderness 01/26/2022    Abdominal tenderness, left upper quadrant    Other chest pain 09/16/2020    Atypical chest pain    Other infective otitis externa, right ear 03/27/2021    Acute infective otitis externa, right    Other muscle spasm 01/26/2022    Trapezius muscle spasm    Other specified health status     No pertinent past medical history    Personal history of other specified conditions 04/21/2022    History of chest pain    Personal history of other specified conditions 04/21/2022    History of palpitations    Personal history of urinary calculi     History of kidney stones    Pleurodynia 09/01/2021    Rib pain on left side   [3]   Past Surgical History:  Procedure Laterality Date    NO PAST SURGERIES  09/16/2020    No history of surgery

## 2025-06-06 ENCOUNTER — OFFICE VISIT (OUTPATIENT)
Dept: PRIMARY CARE | Facility: CLINIC | Age: 40
End: 2025-06-06
Payer: COMMERCIAL

## 2025-06-06 VITALS
BODY MASS INDEX: 42.66 KG/M2 | WEIGHT: 315 LBS | HEIGHT: 72 IN | HEART RATE: 83 BPM | DIASTOLIC BLOOD PRESSURE: 66 MMHG | SYSTOLIC BLOOD PRESSURE: 119 MMHG | OXYGEN SATURATION: 99 % | RESPIRATION RATE: 18 BRPM

## 2025-06-06 DIAGNOSIS — D58.2 HEMOGLOBIN C DISEASE: ICD-10-CM

## 2025-06-06 DIAGNOSIS — E66.813 CLASS 3 SEVERE OBESITY DUE TO EXCESS CALORIES WITHOUT SERIOUS COMORBIDITY WITH BODY MASS INDEX (BMI) OF 45.0 TO 49.9 IN ADULT: ICD-10-CM

## 2025-06-06 DIAGNOSIS — Z00.00 ANNUAL PHYSICAL EXAM: Primary | ICD-10-CM

## 2025-06-06 DIAGNOSIS — K76.0 FATTY LIVER: ICD-10-CM

## 2025-06-06 DIAGNOSIS — D50.9 IRON DEFICIENCY ANEMIA, UNSPECIFIED IRON DEFICIENCY ANEMIA TYPE: ICD-10-CM

## 2025-06-06 DIAGNOSIS — J45.901 ASTHMA WITH ACUTE EXACERBATION, UNSPECIFIED ASTHMA SEVERITY, UNSPECIFIED WHETHER PERSISTENT (HHS-HCC): ICD-10-CM

## 2025-06-06 PROBLEM — R42 VERTIGO: Status: RESOLVED | Noted: 2023-06-12 | Resolved: 2025-06-06

## 2025-06-06 PROCEDURE — 1036F TOBACCO NON-USER: CPT | Performed by: FAMILY MEDICINE

## 2025-06-06 PROCEDURE — 3008F BODY MASS INDEX DOCD: CPT | Performed by: FAMILY MEDICINE

## 2025-06-06 PROCEDURE — 99395 PREV VISIT EST AGE 18-39: CPT | Performed by: FAMILY MEDICINE

## 2025-06-06 PROCEDURE — 99214 OFFICE O/P EST MOD 30 MIN: CPT | Performed by: FAMILY MEDICINE

## 2025-06-06 RX ORDER — ALBUTEROL SULFATE 90 UG/1
1 INHALANT RESPIRATORY (INHALATION) EVERY 4 HOURS PRN
Qty: 8 G | Refills: 0 | Status: SHIPPED | OUTPATIENT
Start: 2025-06-06

## 2025-06-06 RX ORDER — BECLOMETHASONE DIPROPIONATE HFA 80 UG/1
80 AEROSOL, METERED RESPIRATORY (INHALATION) 2 TIMES DAILY
Qty: 31.8 G | Refills: 1 | Status: SHIPPED | OUTPATIENT
Start: 2025-06-06

## 2025-06-06 ASSESSMENT — PATIENT HEALTH QUESTIONNAIRE - PHQ9
1. LITTLE INTEREST OR PLEASURE IN DOING THINGS: NOT AT ALL
SUM OF ALL RESPONSES TO PHQ9 QUESTIONS 1 AND 2: 0
2. FEELING DOWN, DEPRESSED OR HOPELESS: NOT AT ALL

## 2025-06-06 NOTE — PATIENT INSTRUCTIONS
Fasting labs.  Liver ultrasound.  Refilled inhalers.  Referred to endocrine.  Recommend weight loss efforts (see [email or web address] for ideas).  Follow up with hematology and gastroenterology as directed.    F/U 6 months: Med refills.    Lab services: [address]  Hours: M-F 7:15a-6:00p  Phone: [phone], Option 1

## 2025-06-06 NOTE — PROGRESS NOTES
Subjective   Patient ID: Will Garcia is a 39 y.o. male who presents for Med Refill and Annual Exam.    HPI   Last visit 2023.    Patient's health is described as fair.  Regular dental visits: Yes.  Dental hygiene (brushing/flossing) regularly performed: Yes.  Corrective lenses: No.  Vision problems: No.  Last eye exam within 1 year: No.  Hearing loss: No.  Requests audiology referral: No.  Immunizations up to date: No (declines pcv20--asthma).  Healthy diet: No (over-eating).  Regular exercise: Yes (walks 2 miles/day).  Trying to lose weight: Yes.  Requests nutrition/weight loss referral: Yes (wants to try GLP1 for weight loss).  Sexually active: No.  Using contraception: N/A.  Requests STD screening: No.  Colon cancer screening up to date: N/A.  Lung cancer screening up to date: N/A.  Hepatitis C screening up to date: Yes.    Has Asthma.  No Qvar > 1 year.  Requests refills of Qvar and Albuterol.    Reviewed/Updated Active problem list, PMH, PSH, FH, SH, Meds, Allergies.    Review of Systems  No other complaints.     Objective   /66   Pulse 83   Resp 18   Ht 1.829 m (6')   Wt (!) 161 kg (356 lb)   SpO2 99%   BMI 48.28 kg/m²     Physical Exam  Constitutional:       General: He is not in acute distress.     Appearance: He is morbidly obese.   HENT:      Head: Normocephalic.      Right Ear: Tympanic membrane normal.      Left Ear: Tympanic membrane normal.      Mouth/Throat:      Pharynx: Oropharynx is clear. No oropharyngeal exudate or posterior oropharyngeal erythema.   Eyes:      Extraocular Movements: Extraocular movements intact.      Conjunctiva/sclera: Conjunctivae normal.      Pupils: Pupils are equal, round, and reactive to light.   Neck:      Thyroid: No thyromegaly.      Vascular: No carotid bruit.   Cardiovascular:      Rate and Rhythm: Normal rate and regular rhythm.      Heart sounds: Normal heart sounds. No murmur heard.     No friction rub. No gallop.   Pulmonary:      Effort: Pulmonary  effort is normal.      Breath sounds: Normal breath sounds. No wheezing, rhonchi or rales.   Abdominal:      General: Bowel sounds are normal. There is no distension.      Palpations: Abdomen is soft. There is no mass.      Tenderness: There is no abdominal tenderness. There is no guarding or rebound.   Lymphadenopathy:      Cervical: No cervical adenopathy.   Skin:     Coloration: Skin is not jaundiced or pale.   Neurological:      General: No focal deficit present.      Mental Status: He is oriented to person, place, and time.   Psychiatric:         Mood and Affect: Mood normal.         Behavior: Behavior normal.     Assessment/Plan   Diagnoses and all orders for this visit:  Annual physical exam  -     Basic Metabolic Panel; Future  -     Lipid Panel; Future  Asthma with acute exacerbation, unspecified asthma severity, unspecified whether persistent ([facility])  -     beclomethasone (Qvar RediHaler) 80 mcg/actuation inhaler; Inhale 1 Inhalation 2 times a day.  -     albuterol 90 mcg/actuation inhaler; Inhale 1 puff every 4 hours if needed for wheezing or shortness of breath.  Fatty liver  -     Hepatic function panel; Future  -     US abdomen limited liver; Future  Hemoglobin C disease  -     CBC; Future  Iron deficiency anemia, unspecified iron deficiency anemia type  -     CBC; Future  Class 3 severe obesity due to excess calories without serious comorbidity with body mass index (BMI) of 45.0 to 49.9 in adult  -     Referral to Endocrinology; Future    Fasting labs.  Liver ultrasound.  Refilled inhalers.  Referred to endocrine.  Recommend weight loss efforts (see [email or web address] for ideas).  Follow up with hematology and gastroenterology as directed.    F/U 6 months: Med refills.

## 2025-06-07 LAB
ALBUMIN SERPL-MCNC: 4 G/DL (ref 3.6–5.1)
ALBUMIN/GLOB SERPL: 1.1 (CALC) (ref 1–2.5)
ALP SERPL-CCNC: 27 U/L (ref 36–130)
ALT SERPL-CCNC: 21 U/L (ref 9–46)
ANION GAP SERPL CALCULATED.4IONS-SCNC: 9 MMOL/L (CALC) (ref 7–17)
AST SERPL-CCNC: 22 U/L (ref 10–40)
BILIRUB DIRECT SERPL-MCNC: 0.4 MG/DL
BILIRUB INDIRECT SERPL-MCNC: 1.2 MG/DL (CALC) (ref 0.2–1.2)
BILIRUB SERPL-MCNC: 1.6 MG/DL (ref 0.2–1.2)
BUN SERPL-MCNC: 12 MG/DL (ref 7–25)
BUN/CREAT SERPL: NORMAL (CALC) (ref 6–22)
CALCIUM SERPL-MCNC: 8.7 MG/DL (ref 8.6–10.3)
CHLORIDE SERPL-SCNC: 106 MMOL/L (ref 98–110)
CHOLEST SERPL-MCNC: 105 MG/DL
CHOLEST/HDLC SERPL: 3 (CALC)
CO2 SERPL-SCNC: 24 MMOL/L (ref 20–32)
CREAT SERPL-MCNC: 0.7 MG/DL (ref 0.6–1.26)
EGFRCR SERPLBLD CKD-EPI 2021: 122 ML/MIN/1.73M2
ERYTHROCYTE [DISTWIDTH] IN BLOOD BY AUTOMATED COUNT: 22.8 % (ref 11–15)
GLOBULIN SER CALC-MCNC: 3.5 G/DL (CALC) (ref 1.9–3.7)
GLUCOSE SERPL-MCNC: 86 MG/DL (ref 65–99)
HCT VFR BLD AUTO: 32.3 % (ref 38.5–50)
HDLC SERPL-MCNC: 35 MG/DL
HGB BLD-MCNC: 10.1 G/DL (ref 13.2–17.1)
LDLC SERPL CALC-MCNC: 56 MG/DL (CALC)
MCH RBC QN AUTO: 22.9 PG (ref 27–33)
MCHC RBC AUTO-ENTMCNC: 31.3 G/DL (ref 32–36)
MCV RBC AUTO: 73.2 FL (ref 80–100)
NONHDLC SERPL-MCNC: 70 MG/DL (CALC)
PLATELET # BLD AUTO: 164 THOUSAND/UL (ref 140–400)
PMV BLD REES-ECKER: ABNORMAL FL
POTASSIUM SERPL-SCNC: 4.3 MMOL/L (ref 3.5–5.3)
PROT SERPL-MCNC: 7.5 G/DL (ref 6.1–8.1)
RBC # BLD AUTO: 4.41 MILLION/UL (ref 4.2–5.8)
SODIUM SERPL-SCNC: 139 MMOL/L (ref 135–146)
TRIGL SERPL-MCNC: 59 MG/DL
WBC # BLD AUTO: 9.6 THOUSAND/UL (ref 3.8–10.8)

## 2025-09-11 ENCOUNTER — APPOINTMENT (OUTPATIENT)
Dept: ENDOCRINOLOGY | Facility: CLINIC | Age: 40
End: 2025-09-11
Payer: COMMERCIAL

## 2025-12-04 ENCOUNTER — APPOINTMENT (OUTPATIENT)
Dept: PRIMARY CARE | Facility: CLINIC | Age: 40
End: 2025-12-04
Payer: COMMERCIAL

## 2025-12-05 ENCOUNTER — APPOINTMENT (OUTPATIENT)
Dept: PRIMARY CARE | Facility: CLINIC | Age: 40
End: 2025-12-05
Payer: COMMERCIAL